# Patient Record
Sex: MALE | Race: WHITE | NOT HISPANIC OR LATINO | Employment: OTHER | URBAN - METROPOLITAN AREA
[De-identification: names, ages, dates, MRNs, and addresses within clinical notes are randomized per-mention and may not be internally consistent; named-entity substitution may affect disease eponyms.]

---

## 2023-09-14 PROBLEM — L82.0 INFLAMED SEBORRHEIC KERATOSIS: Status: ACTIVE | Noted: 2023-07-20

## 2023-09-14 PROBLEM — L57.0 ACTINIC KERATOSIS: Status: ACTIVE | Noted: 2023-07-20

## 2023-09-14 PROBLEM — C44.329 SQUAMOUS CELL CARCINOMA OF SKIN OF OTHER PARTS OF FACE: Status: ACTIVE | Noted: 2023-07-20

## 2023-09-14 PROBLEM — C44.82 SQUAMOUS CELL CARCINOMA OF OVERLAPPING SITES OF SKIN: Status: ACTIVE | Noted: 2023-07-20

## 2023-10-26 ENCOUNTER — OFFICE VISIT (OUTPATIENT)
Dept: DERMATOLOGY | Facility: CLINIC | Age: 73
End: 2023-10-26
Payer: MEDICARE

## 2023-10-26 DIAGNOSIS — L57.0 ACTINIC KERATOSIS: ICD-10-CM

## 2023-10-26 PROCEDURE — 88305 TISSUE EXAM BY PATHOLOGIST: CPT | Performed by: DERMATOLOGY

## 2023-10-26 PROCEDURE — 88305 TISSUE EXAM BY PATHOLOGIST: CPT | Mod: TC,DER | Performed by: SPECIALIST

## 2023-10-26 PROCEDURE — 11400 EXC TR-EXT B9+MARG 0.5 CM<: CPT | Performed by: SPECIALIST

## 2023-10-26 PROCEDURE — 99213 OFFICE O/P EST LOW 20 MIN: CPT | Performed by: SPECIALIST

## 2023-10-26 NOTE — PROGRESS NOTES
Subjective   HPI: Christopher Lakhani is a 72 y.o. male is here for evaluation and treatment of numerous lesions involving his face and scalp.  Patient complains that 1 of these is quite painful.  Diffuse numbers of erythematous scaly sensitive lesions involving face and scalp.      ROS: No other skin or systemic complaints other than what is documented elsewhere in the note.    ALLERGIES: Patient has no known allergies.    SOCIAL:  has no history on file for tobacco use, alcohol use, and drug use.    Objective     Description: Examination reveals numerous diffuse erythematous scaly sensitive lesions involving the crown of his scalp and face.  There is one linear hyperkeratotic area that is painful to touch involving his crown.  This area will be biopsied today.  I also discussed Levulan photodynamic therapy for his face and scalp and patient was agreeable.    Assessment/Plan   1. Actinic keratosis  Mid Parietal Scalp    Shave removal - Mid Parietal Scalp    Specimen 1 - Dermatopathology- DERM LAB  Differential Diagnosis: ISK  Check Margins Yes/No?:    Comments:    Dermpath Lab: Routine Histopathology (formalin-fixed tissue)         FOLLOW UP: 2 weeks for Levulan photodynamic therapy and biopsy.    The patient was encouraged to contact me with any further questions or concerns.  Darrel Clifton MD  10/26/2023

## 2023-10-30 LAB
LABORATORY COMMENT REPORT: NORMAL
PATH REPORT.FINAL DX SPEC: NORMAL
PATH REPORT.GROSS SPEC: NORMAL
PATH REPORT.MICROSCOPIC SPEC OTHER STN: NORMAL
PATH REPORT.RELEVANT HX SPEC: NORMAL
PATH REPORT.TOTAL CANCER: NORMAL

## 2023-11-10 NOTE — PROGRESS NOTES
Subjective   HPI: Christopher Lakhani is a 72 y.o. male is here for blue light photodynamic therapy for numerous diffuse erythematous scaly sensitive lesions involving the face and scalp.    ROS: No other skin or systemic complaints other than what is documented elsewhere in the note.    ALLERGIES: Patient has no known allergies.    SOCIAL:  has no history on file for tobacco use, alcohol use, and drug use.    Objective   Head - Anterior (Face) (2), Scalp  Diffuse numbers of erythematous scaly sensitive lesions involving the face and scalp.    Dermatopathology results from 10/30/2023 dermatopathology session ID [id number]       Assessment/Plan   1. Actinic keratosis (3)  Head - Anterior (Face) (2); Scalp    Discussed Levulan photodynamic therapy for his face and scalp.  Patient is agreeable.  Also discussed dermatopathology results from 10/30/2023 dermatopathology session ID [id number] showing an actinic keratosis present on the deep and peripheral margin.  Discussed with patient that the photodynamic therapy should take care of this area.  We will recheck this area at his appointment in 4 weeks.  Discussed with patient need for sunscreen and a hat for the next couple of days.  Patient verbalizes understanding.    Discussed with patient if he has excessive stinging or burning I can send in a Medrol Dosepak and BMV cream x1 week.  Patient defers these 2 medications at this time.    Related Medications  aminolevulinic acid HCl 20 % topical solution 1 Application         FOLLOW UP: 1 month for AK check -please recheck mid parietal scalp dermatopathology session ID [id number]    The patient was encouraged to contact me with any further questions or concerns.  Millicent Albarado PA-C  11/13/2023

## 2023-11-13 ENCOUNTER — OFFICE VISIT (OUTPATIENT)
Dept: DERMATOLOGY | Facility: CLINIC | Age: 73
End: 2023-11-13
Payer: MEDICARE

## 2023-11-13 DIAGNOSIS — L57.0 ACTINIC KERATOSIS: ICD-10-CM

## 2023-11-13 PROCEDURE — 96567 PDT DSTR PRMLG LES SKN: CPT

## 2023-11-13 NOTE — PATIENT INSTRUCTIONS

## 2023-12-04 NOTE — PROGRESS NOTES
Subjective   HPI: Christopher Lakhani is a 72 y.o. male is here for 1 month follow up evaluation after blue light therapy. Patient has a painful scale on his crown.  Christopher feels that the bluelight therapy worked well on his face however he feels the scalp did not get similar results.  He feels numerous areas on his scalp.  Patient also has an area on the back of the neck, 2 on the right hand, 1 on the right arm that he would like investigated.    ROS: No other skin or systemic complaints other than what is documented elsewhere in the note.    ALLERGIES: Patient has no known allergies.    SOCIAL:  has no history on file for tobacco use, alcohol use, and drug use.    Objective   Mid Frontal Scalp, Mid Parietal Scalp  Numerous Thin erythematous papules with gritty scales on the scalp.  3 on the face.  3 on the right arm/right hand.  1 on the posterior neck. >10 on scalp.        Assessment/Plan   1. Actinic keratosis (2)  Mid Frontal Scalp; Mid Parietal Scalp    Discussed with patient that the bluelight treatment worked really well for his face however it did not touch his scalp as well as I had hoped.  Recommended to patient bluelight therapy for specifically scalp only.  Patient is agreeable however he would like to do this after the holidays.  I am okay doing so as I did numerous freezes today on his scalp.  I discussed with patient if he has any sensitive areas to please make a follow-up sooner.  Patient verbalizes understanding and is agreeable.    Cryotherapy, skin lesion - Mid Frontal Scalp, Mid Parietal Scalp         FOLLOW UP: 1 to 2 months for bluelight therapy-please focus on the scalp    The patient was encouraged to contact me with any further questions or concerns.  Millicent Albarado PA-C  12/11/2023

## 2023-12-11 ENCOUNTER — OFFICE VISIT (OUTPATIENT)
Dept: DERMATOLOGY | Facility: CLINIC | Age: 73
End: 2023-12-11
Payer: MEDICARE

## 2023-12-11 DIAGNOSIS — L57.0 ACTINIC KERATOSIS: ICD-10-CM

## 2023-12-11 PROCEDURE — 17004 DESTROY PREMAL LESIONS 15/>: CPT

## 2023-12-11 PROCEDURE — 99212 OFFICE O/P EST SF 10 MIN: CPT

## 2024-03-04 ENCOUNTER — OFFICE VISIT (OUTPATIENT)
Dept: DERMATOLOGY | Facility: CLINIC | Age: 74
End: 2024-03-04
Payer: MEDICARE

## 2024-03-04 DIAGNOSIS — L57.0 ACTINIC KERATOSIS: ICD-10-CM

## 2024-03-04 PROCEDURE — 96573 PDT DSTR PRMLG LES PHYS/QHP: CPT

## 2024-03-04 PROCEDURE — 1159F MED LIST DOCD IN RCRD: CPT

## 2024-03-04 RX ORDER — BETAMETHASONE VALERATE 1 MG/G
CREAM TOPICAL 2 TIMES DAILY
Qty: 45 G | Refills: 0 | Status: SHIPPED | OUTPATIENT
Start: 2024-03-04 | End: 2024-03-04 | Stop reason: ENTERED-IN-ERROR

## 2024-03-04 RX ORDER — METHYLPREDNISOLONE 4 MG/1
TABLET ORAL
Qty: 21 TABLET | Refills: 0 | Status: SHIPPED | OUTPATIENT
Start: 2024-03-04 | End: 2024-03-04 | Stop reason: ENTERED-IN-ERROR

## 2024-03-04 RX ORDER — METHYLPREDNISOLONE 4 MG/1
TABLET ORAL
Qty: 21 TABLET | Refills: 0 | Status: SHIPPED | OUTPATIENT
Start: 2024-03-04 | End: 2024-03-11

## 2024-03-04 RX ORDER — BETAMETHASONE VALERATE 1 MG/G
CREAM TOPICAL 2 TIMES DAILY
Qty: 45 G | Refills: 0 | Status: SHIPPED | OUTPATIENT
Start: 2024-03-04

## 2024-03-04 NOTE — PROGRESS NOTES
Subjective   HPI: Christopher Lakhani is a 73 y.o. male is here for numerous AK's on scalp to be treated with bluelight therapy today.     ROS: No other skin or systemic complaints other than what is documented elsewhere in the note.    ALLERGIES: Patient has no known allergies.    SOCIAL:  has no history on file for tobacco use, alcohol use, and drug use.    Objective   Scalp  Numerous thin erythematous papules with gritty scales on a photodamaged area on the scalp          Assessment/Plan   1. Actinic keratosis  Scalp    Due to the numerous amounts of AK's on the scalp I recommended bluelight therapy.  Discussed with patient importance of wearing SPF 60 or higher for the next couple of weeks.    Photodynamic therapy - Scalp    Related Medications  aminolevulinic acid HCl 20 % topical solution 1 Application      methylPREDNISolone (Medrol Dospak) 4 mg tablets  Follow schedule on package instructions    betamethasone valerate (Valisone) 0.1 % cream  Apply topically 2 times a day. Apply topically to affected area bid. DO NOT USE ON FACE OR GROIN.         FOLLOW UP: 4 weeks-AK check    The patient was encouraged to contact me with any further questions or concerns.  Millicent Albarado PA-C  3/4/2024

## 2024-04-02 ENCOUNTER — APPOINTMENT (OUTPATIENT)
Dept: DERMATOLOGY | Facility: CLINIC | Age: 74
End: 2024-04-02
Payer: MEDICARE